# Patient Record
Sex: MALE | Race: WHITE | Employment: FULL TIME | URBAN - METROPOLITAN AREA
[De-identification: names, ages, dates, MRNs, and addresses within clinical notes are randomized per-mention and may not be internally consistent; named-entity substitution may affect disease eponyms.]

---

## 2022-04-15 ENCOUNTER — HOSPITAL ENCOUNTER (EMERGENCY)
Age: 47
Discharge: HOME | End: 2022-04-15
Attending: EMERGENCY MEDICINE | Admitting: EMERGENCY MEDICINE
Payer: COMMERCIAL

## 2022-04-15 VITALS
RESPIRATION RATE: 18 BRPM | SYSTOLIC BLOOD PRESSURE: 148 MMHG | WEIGHT: 26.01 LBS | DIASTOLIC BLOOD PRESSURE: 99 MMHG | HEART RATE: 99 BPM | HEIGHT: 70 IN | TEMPERATURE: 98 F | BODY MASS INDEX: 3.72 KG/M2 | OXYGEN SATURATION: 94 %

## 2022-04-15 DIAGNOSIS — G51.0 BELL'S PALSY: Primary | ICD-10-CM

## 2022-04-15 PROCEDURE — 99284 EMERGENCY DEPT VISIT MOD MDM: CPT

## 2022-04-15 RX ORDER — PREDNISONE 50 MG/1
50 TABLET ORAL DAILY
Qty: 7 TABLET | Refills: 0 | Status: SHIPPED | OUTPATIENT
Start: 2022-04-15 | End: 2022-04-22

## 2022-04-15 RX ORDER — VALACYCLOVIR HYDROCHLORIDE 1 G/1
1000 TABLET, FILM COATED ORAL DAILY
COMMUNITY

## 2022-04-15 NOTE — DISCHARGE INSTRUCTIONS
Return to the emergency department with severe headache, fever, or any other symptoms you find concerning. In the meantime please take the prednisone as prescribed. Continue following up with your ophthalmologist and you should also follow-up with your primary care doctor regarding your high blood pressure.

## 2022-04-15 NOTE — ED PROVIDER NOTES
HPI   31-year-old male with no known medical history presents to the emergency department due to right-sided facial droop. Patient was diagnosed with shingles 2 weeks ago. He has recently completed a course of acyclovir. He says that his face has been swollen which is improved. Last night he noticed a right-sided facial droop. He followed up with his ophthalmologist today. His eyes are reportedly doing well, but his ophthalmologist sent him to the emergency department due to his facial droop. He does note an intermittent posterior headache at the base of his occiput that he describes as sharp and it last for only a few seconds at a time. Is currently not having a headache. He denies any visual changes. He says he has a difficult time closing his right eye and he cannot lift his right eyebrow up. History reviewed. No pertinent past medical history. History reviewed. No pertinent surgical history. History reviewed. No pertinent family history. Social History     Socioeconomic History    Marital status:      Spouse name: Not on file    Number of children: Not on file    Years of education: Not on file    Highest education level: Not on file   Occupational History    Not on file   Tobacco Use    Smoking status: Former Smoker    Smokeless tobacco: Current User   Vaping Use    Vaping Use: Every day    Substances: Nicotine   Substance and Sexual Activity    Alcohol use: Not Currently    Drug use: Never    Sexual activity: Yes   Other Topics Concern    Not on file   Social History Narrative    Not on file     Social Determinants of Health     Financial Resource Strain:     Difficulty of Paying Living Expenses: Not on file   Food Insecurity:     Worried About Running Out of Food in the Last Year: Not on file    Scarlet of Food in the Last Year: Not on file   Transportation Needs:     Lack of Transportation (Medical): Not on file    Lack of Transportation (Non-Medical):  Not on file   Physical Activity:     Days of Exercise per Week: Not on file    Minutes of Exercise per Session: Not on file   Stress:     Feeling of Stress : Not on file   Social Connections:     Frequency of Communication with Friends and Family: Not on file    Frequency of Social Gatherings with Friends and Family: Not on file    Attends Anab[employer]ist Services: Not on file    Active Member of [address] [place] [address] or Organizations: Not on file    Attends Club or Organization Meetings: Not on file    Marital Status: Not on file   Intimate Partner Violence:     Fear of Current or Ex-Partner: Not on file    Emotionally Abused: Not on file    Physically Abused: Not on file    Sexually Abused: Not on file   Housing Stability:     Unable to Pay for Housing in the Last Year: Not on file    Number of [place] in the Last Year: Not on file    Unstable Housing in the Last Year: Not on file         ALLERGIES: Patient has no known allergies. Review of Systems   A complete review of systems was performed and all systems reviewed were negative unless otherwise documented in the HPI. Vitals:    04/15/22 1119   BP: (!) 144/92   Pulse: 96   Resp: 15   Temp: 97.8 °F (36.6 °C)   SpO2: 95%   Weight: 11.8 kg (26 lb 0.2 oz)   Height: 5' 10\" (1.778 m)            Physical Exam  Constitutional:       Comments: Nontoxic. Not acutely distressed   HENT:      Head:      Comments: Patient has crusted lesions in the V1 and V2 distribution on the right side of the face with no surrounding erythema or purulence. Eyes:      Comments: Pupils are dilated bilaterally after coming from the ophthalmologist office. Extraocular movements intact. Neck:      Comments: Trachea midline. No JVD  Cardiovascular:      Comments: Regular rate and rhythm. No murmurs  Pulmonary:      Effort: Pulmonary effort is normal. No respiratory distress. Breath sounds: Normal breath sounds. No wheezing or rales. Abdominal:      General: There is no distension. Palpations: Abdomen is soft. Tenderness: There is no abdominal tenderness. Musculoskeletal:         General: No deformity. Normal range of motion. Skin:     General: Skin is warm and dry. Neurological:      Comments: Awake and alert. Speech is normal.  No dysarthria or aphasia. Strength and sensation are grossly intact. Ambulates with a normal gait. Patient has a facial droop involving the right upper and lower face and inability to completely close the right eye. MDM   79-year-old man who presents with above chief complaint. Vital signs stable. Patient has Bell's palsy on exam with facial droop of the right upper and lower face as well as inability to completely close his right eye. He does not have any focal neurologic deficits otherwise. Patient will be prescribed some prednisone. Patient has already been prescribed lubricating eyedrops and I counseled him on how to tape his eye shut at night. Exam is not consistent with an ischemic stroke. Patient encouraged to return the emergency department with any worsening symptoms. His wife is concerned about his elevated blood pressure so I instructed the patient to follow-up with his primary care doctor to discuss his blood pressure further. Patient discharged in stable condition.     Procedures

## 2022-04-15 NOTE — ED TRIAGE NOTES
Patient with shingles infection x 2 weeks, noticed headache and right facial droop starting 4/14 at 7 am. Followed up with eye dr today and was referred here. Right facial droop noted with no other right sided deficit at this time. Shingles noted to right side of face.